# Patient Record
Sex: MALE | Race: WHITE | NOT HISPANIC OR LATINO | Employment: UNEMPLOYED | URBAN - METROPOLITAN AREA
[De-identification: names, ages, dates, MRNs, and addresses within clinical notes are randomized per-mention and may not be internally consistent; named-entity substitution may affect disease eponyms.]

---

## 2020-06-18 ENCOUNTER — TRANSFERRED RECORDS (OUTPATIENT)
Dept: HEALTH INFORMATION MANAGEMENT | Facility: CLINIC | Age: 40
End: 2020-06-18

## 2020-08-19 ENCOUNTER — MEDICAL CORRESPONDENCE (OUTPATIENT)
Dept: HEALTH INFORMATION MANAGEMENT | Facility: CLINIC | Age: 40
End: 2020-08-19

## 2020-08-27 ENCOUNTER — TRANSFERRED RECORDS (OUTPATIENT)
Dept: HEALTH INFORMATION MANAGEMENT | Facility: CLINIC | Age: 40
End: 2020-08-27

## 2020-11-04 ENCOUNTER — TRANSFERRED RECORDS (OUTPATIENT)
Dept: HEALTH INFORMATION MANAGEMENT | Facility: CLINIC | Age: 40
End: 2020-11-04

## 2020-11-06 ENCOUNTER — OFFICE VISIT (OUTPATIENT)
Dept: NEUROLOGY | Facility: CLINIC | Age: 40
End: 2020-11-06
Payer: COMMERCIAL

## 2020-11-06 VITALS
BODY MASS INDEX: 29.49 KG/M2 | SYSTOLIC BLOOD PRESSURE: 144 MMHG | HEART RATE: 94 BPM | WEIGHT: 206 LBS | DIASTOLIC BLOOD PRESSURE: 106 MMHG | HEIGHT: 70 IN

## 2020-11-06 DIAGNOSIS — Z87.820 HX OF TRAUMATIC BRAIN INJURY: ICD-10-CM

## 2020-11-06 DIAGNOSIS — H53.9 VISUAL DISTURBANCE: Primary | ICD-10-CM

## 2020-11-06 DIAGNOSIS — R51.9 NONINTRACTABLE HEADACHE, UNSPECIFIED CHRONICITY PATTERN, UNSPECIFIED HEADACHE TYPE: ICD-10-CM

## 2020-11-06 PROCEDURE — 99244 OFF/OP CNSLTJ NEW/EST MOD 40: CPT | Performed by: PSYCHIATRY & NEUROLOGY

## 2020-11-06 RX ORDER — LAMOTRIGINE 150 MG/1
75 TABLET ORAL 2 TIMES DAILY
COMMUNITY
End: 2020-11-06

## 2020-11-06 RX ORDER — SIMVASTATIN 20 MG
20 TABLET ORAL AT BEDTIME
COMMUNITY

## 2020-11-06 RX ORDER — TOPIRAMATE 100 MG/1
100 TABLET, FILM COATED ORAL 2 TIMES DAILY
COMMUNITY

## 2020-11-06 RX ORDER — OXYBUTYNIN CHLORIDE 5 MG/1
5 TABLET ORAL 3 TIMES DAILY
COMMUNITY

## 2020-11-06 RX ORDER — VENLAFAXINE 75 MG/1
150 TABLET ORAL 2 TIMES DAILY
COMMUNITY

## 2020-11-06 RX ORDER — BACLOFEN 10 MG/1
10 TABLET ORAL 3 TIMES DAILY
COMMUNITY

## 2020-11-06 RX ORDER — LAMOTRIGINE 150 MG/1
150 TABLET ORAL 2 TIMES DAILY
COMMUNITY

## 2020-11-06 RX ORDER — CETIRIZINE HYDROCHLORIDE 10 MG/1
10 TABLET ORAL DAILY
COMMUNITY

## 2020-11-06 ASSESSMENT — MIFFLIN-ST. JEOR: SCORE: 1840.66

## 2020-11-06 NOTE — NURSING NOTE
Chief Complaint   Patient presents with     visual changes     Margarita Gtz CMA on 11/6/2020 at 7:56 AM

## 2020-11-06 NOTE — LETTER
11/6/2020         RE: Hola Bentley  [place]  [address] [facility] [address]        Dear Colleague,    Thank you for referring your patient, Hola Bentley, to the [facility] [place]. Please see a copy of my visit note below.    NEUROLOGY OUTPATIENT CONSULT NOTE   Nov 6, 2020     CHIEF COMPLAINT/REASON FOR VISIT/REASON FOR CONSULT  Patient presents with:  visual changes    REASON FOR CONSULTATION- Vision changes    REFERRAL SOURCE  Dr. Darinel Montesinos  CC Dr. Darinel Montesinos    HISTORY OF PRESENT ILLNESS  Hola Bentley is a 40 year old male seen today for evaluation of vision changes.  Patient is currently in the halfway.  Patient reports that in March 2020 he injured his head.  Since then he has been having headaches all the time.  He also complains of flashing white lights in his periphery along with double vision.  The double vision is vertical.  The double vision is all directions.  He also this blurred vision.  Symptoms are mainly present in the left eye.  He has a prosthetic right eye.  He is on Topamax for his back pain though this is helping with his headaches.  Headaches pain is continuous.  Rarely become more severe.  Reports no triggers to these symptoms.  Headaches are all over.  He further complains of some numbness and tingling in his legs related to his back.  Reports no other triggers for his symptoms.  He has vision symptoms multiple times a day.  The last for few minutes when they come on.  Symptoms have been ongoing since the TBI.  Patient feels that they might have worsened.  Topamax does not help the spells.    Previous history is reviewed and this is unchanged.    PAST MEDICAL/SURGICAL HISTORY  History reviewed. No pertinent past medical history.  There is no problem list on file for this patient.  Significant for migraines, arthritis, depression    FAMILY HISTORY  History reviewed. No pertinent family history.  Family history negative for  "migraines.  Positive for depression    SOCIAL HISTORY  Social History     Tobacco Use     Smoking status: Unknown If Ever Smoked   Substance Use Topics     Alcohol use: Not Currently     Drug use: Not Currently       SYSTEMS REVIEW  Twelve-system ROS was done and other than the HPI this was negative except for neck pain, back pain, joint pain, numbness tingling, difficulty walking, bladder symptoms, difficulty swallowing, vision symptoms, headaches, anxiety, depression, palpitations.    MEDICATIONS       baclofen (LIORESAL) 10 MG tablet, Take 10 mg by mouth 3 times daily       cetirizine (ZYRTEC) 10 MG tablet, Take 10 mg by mouth daily       lamoTRIgine (LAMICTAL) 150 MG tablet, Take 150 mg by mouth 2 times daily       oxybutynin (DITROPAN) 5 MG tablet, Take 5 mg by mouth 3 times daily       simvastatin (ZOCOR) 20 MG tablet, Take 20 mg by mouth At Bedtime       topiramate (TOPAMAX) 100 MG tablet, Take 100 mg by mouth 2 times daily       venlafaxine (EFFEXOR) 75 MG tablet, Take 150 mg by mouth 2 times daily    No current facility-administered medications on file prior to visit.        PHYSICAL EXAMINATION  VITALS: BP (!) 144/106   Pulse 94   Ht 1.778 m (5' 10\")   Wt 93.4 kg (206 lb)   BMI 29.56 kg/m    GENERAL: Healthy appearing, alert, no acute distress, normal habitus.  CARDIOVASCULAR: Extremities warm and well-perfused.  EYES: Funduscopic exam limited.  NEUROLOGICAL:  Patient is awake and oriented to self, place and time.  Attention span is normal.  Memory is grossly intact.  Language is fluent and follows commands appropriately.  Appropriate fund of knowledge. Cranial nerves 2-12 are intact with right prosthetic eye. Double vision was in all directions. There is no pronator drift.  Motor exam shows 5/5 strength in all extremities.  Tone is symmetric bilaterally in upper and lower extremities.  Reflexes are symmetric and 1+ in upper extremities and 2+ in lower extremities.  There are absent in ankles. Sensory " exam is grossly intact to light touch, pin prick and vibration.  Finger to nose and heel to shin is without dysmetria.  Romberg is negative.  Gait is normal and the patient is able to do tandem walk and walk on toes and heels with some difficulty.     DIAGNOSTICS  CT  1. Shows normal CT appearance of the brain and intracranial compartment without hemorrhage, space-occupying pathology, large tentorial infarct or other acute process  2. Right globe prosthesis    OUTSIDE RECORDS  Outside referral notes were reviewed and pertinent information has been summarized:-Patient was seen in the eye clinic.  Patient had a pathological examination.  Report was reviewed.  This was negative for any ophthalmology causes.    IMPRESSION/REPORT/PLAN  Visual disturbance  Hx of traumatic brain injury  Nonintractable headache, unspecified chronicity pattern, unspecified headache type    This is a 40 year old male with complains of vision disturbance, double vision, recent TBI and headaches since then.  Head CT has been negative for any intracranial lesions.  I suspect his symptoms are related to his ongoing headaches.  Is not unusual for people to have auras with migraines.  He is already on Topamax which is helping.  Other medication that would be tried include Depakote, nortriptyline, propanolol to see if the headache pain can be under good control.  This can be done through patient's primary doctor.  Patients with with a prosthetic eye will not get double vision related to extraocular movement abnormalities.  An MRI brain could be considered if his symptoms were getting worse.  Follow back as needed.    Over 45 minutes were spent coordinating the care for the patient today.  More than 50% of the time was spent counseling, educating the patient.    Neo Nayak MD  Neurologist  [facility]  Tel:- [phone]    This note was dictated using voice recognition software.  Any grammatical or context  distortions are unintentional and inherent to the software.        Again, thank you for allowing me to participate in the care of your patient.        Sincerely,        Neo Nayak MD

## 2020-11-06 NOTE — PROGRESS NOTES
NEUROLOGY OUTPATIENT CONSULT NOTE   Nov 6, 2020     CHIEF COMPLAINT/REASON FOR VISIT/REASON FOR CONSULT  Patient presents with:  visual changes    REASON FOR CONSULTATION- Vision changes    REFERRAL SOURCE  Dr. Darinel Montesinos  CC Dr. Darinel Montesinos    HISTORY OF PRESENT ILLNESS  Hloa Bentley is a 40 year old male seen today for evaluation of vision changes.  Patient is currently in the skilled nursing.  Patient reports that in March 2020 he injured his head.  Since then he has been having headaches all the time.  He also complains of flashing white lights in his periphery along with double vision.  The double vision is vertical.  The double vision is all directions.  He also this blurred vision.  Symptoms are mainly present in the left eye.  He has a prosthetic right eye.  He is on Topamax for his back pain though this is helping with his headaches.  Headaches pain is continuous.  Rarely become more severe.  Reports no triggers to these symptoms.  Headaches are all over.  He further complains of some numbness and tingling in his legs related to his back.  Reports no other triggers for his symptoms.  He has vision symptoms multiple times a day.  The last for few minutes when they come on.  Symptoms have been ongoing since the TBI.  Patient feels that they might have worsened.  Topamax does not help the spells.    Previous history is reviewed and this is unchanged.    PAST MEDICAL/SURGICAL HISTORY  History reviewed. No pertinent past medical history.  There is no problem list on file for this patient.  Significant for migraines, arthritis, depression    FAMILY HISTORY  History reviewed. No pertinent family history.  Family history negative for migraines.  Positive for depression    SOCIAL HISTORY  Social History     Tobacco Use     Smoking status: Unknown If Ever Smoked   Substance Use Topics     Alcohol use: Not Currently     Drug use: Not Currently       SYSTEMS REVIEW  Twelve-system ROS was done and other than the HPI this was  "negative except for neck pain, back pain, joint pain, numbness tingling, difficulty walking, bladder symptoms, difficulty swallowing, vision symptoms, headaches, anxiety, depression, palpitations.    MEDICATIONS       baclofen (LIORESAL) 10 MG tablet, Take 10 mg by mouth 3 times daily       cetirizine (ZYRTEC) 10 MG tablet, Take 10 mg by mouth daily       lamoTRIgine (LAMICTAL) 150 MG tablet, Take 150 mg by mouth 2 times daily       oxybutynin (DITROPAN) 5 MG tablet, Take 5 mg by mouth 3 times daily       simvastatin (ZOCOR) 20 MG tablet, Take 20 mg by mouth At Bedtime       topiramate (TOPAMAX) 100 MG tablet, Take 100 mg by mouth 2 times daily       venlafaxine (EFFEXOR) 75 MG tablet, Take 150 mg by mouth 2 times daily    No current facility-administered medications on file prior to visit.        PHYSICAL EXAMINATION  VITALS: BP (!) 144/106   Pulse 94   Ht 1.778 m (5' 10\")   Wt 93.4 kg (206 lb)   BMI 29.56 kg/m    GENERAL: Healthy appearing, alert, no acute distress, normal habitus.  CARDIOVASCULAR: Extremities warm and well-perfused.  EYES: Funduscopic exam limited.  NEUROLOGICAL:  Patient is awake and oriented to self, place and time.  Attention span is normal.  Memory is grossly intact.  Language is fluent and follows commands appropriately.  Appropriate fund of knowledge. Cranial nerves 2-12 are intact with right prosthetic eye. Double vision was in all directions. There is no pronator drift.  Motor exam shows 5/5 strength in all extremities.  Tone is symmetric bilaterally in upper and lower extremities.  Reflexes are symmetric and 1+ in upper extremities and 2+ in lower extremities.  There are absent in ankles. Sensory exam is grossly intact to light touch, pin prick and vibration.  Finger to nose and heel to shin is without dysmetria.  Romberg is negative.  Gait is normal and the patient is able to do tandem walk and walk on toes and heels with some difficulty.     DIAGNOSTICS  CT  1. Shows normal CT " appearance of the brain and intracranial compartment without hemorrhage, space-occupying pathology, large tentorial infarct or other acute process  2. Right globe prosthesis    OUTSIDE RECORDS  Outside referral notes were reviewed and pertinent information has been summarized:-Patient was seen in the eye clinic.  Patient had a pathological examination.  Report was reviewed.  This was negative for any ophthalmology causes.    IMPRESSION/REPORT/PLAN  Visual disturbance  Hx of traumatic brain injury  Nonintractable headache, unspecified chronicity pattern, unspecified headache type    This is a 40 year old male with complains of vision disturbance, double vision, recent TBI and headaches since then.  Head CT has been negative for any intracranial lesions.  I suspect his symptoms are related to his ongoing headaches.  Is not unusual for people to have auras with migraines.  He is already on Topamax which is helping.  Other medication that would be tried include Depakote, nortriptyline, propanolol to see if the headache pain can be under good control.  This can be done through patient's primary doctor.  Patients with with a prosthetic eye will not get double vision related to extraocular movement abnormalities.  An MRI brain could be considered if his symptoms were getting worse.  Follow back as needed.    Over 45 minutes were spent coordinating the care for the patient today.  More than 50% of the time was spent counseling, educating the patient.    Neo Nayak MD  Neurologist  [facility]rology [facility]  Tel:- [phone]    This note was dictated using voice recognition software.  Any grammatical or context distortions are unintentional and inherent to the software.